# Patient Record
Sex: FEMALE | Race: WHITE | NOT HISPANIC OR LATINO | Employment: FULL TIME | URBAN - METROPOLITAN AREA
[De-identification: names, ages, dates, MRNs, and addresses within clinical notes are randomized per-mention and may not be internally consistent; named-entity substitution may affect disease eponyms.]

---

## 2017-04-08 ENCOUNTER — OFFICE VISIT (OUTPATIENT)
Dept: URGENT CARE | Facility: CLINIC | Age: 47
End: 2017-04-08
Payer: COMMERCIAL

## 2017-04-08 VITALS
HEIGHT: 65 IN | WEIGHT: 158 LBS | SYSTOLIC BLOOD PRESSURE: 126 MMHG | RESPIRATION RATE: 16 BRPM | DIASTOLIC BLOOD PRESSURE: 76 MMHG | BODY MASS INDEX: 26.33 KG/M2 | HEART RATE: 91 BPM | OXYGEN SATURATION: 97 % | TEMPERATURE: 99.7 F

## 2017-04-08 DIAGNOSIS — N90.89 LABIAL LESION: ICD-10-CM

## 2017-04-08 DIAGNOSIS — R52 BODY ACHES: ICD-10-CM

## 2017-04-08 LAB
FLUAV+FLUBV AG SPEC QL IA: NORMAL
HETEROPH AB SER QL LA: NORMAL
INT CON NEG: NEGATIVE
INT CON NEG: NEGATIVE
INT CON POS: POSITIVE
INT CON POS: POSITIVE

## 2017-04-08 PROCEDURE — 87804 INFLUENZA ASSAY W/OPTIC: CPT | Performed by: PHYSICIAN ASSISTANT

## 2017-04-08 PROCEDURE — 99000 SPECIMEN HANDLING OFFICE-LAB: CPT | Performed by: PHYSICIAN ASSISTANT

## 2017-04-08 PROCEDURE — 99203 OFFICE O/P NEW LOW 30 MIN: CPT | Performed by: PHYSICIAN ASSISTANT

## 2017-04-08 PROCEDURE — 86308 HETEROPHILE ANTIBODY SCREEN: CPT | Performed by: PHYSICIAN ASSISTANT

## 2017-04-08 ASSESSMENT — PAIN SCALES - GENERAL: PAINLEVEL: 4=SLIGHT-MODERATE PAIN

## 2017-04-11 ASSESSMENT — ENCOUNTER SYMPTOMS
FEVER: 1
SPUTUM PRODUCTION: 0
NAUSEA: 0
HEADACHES: 0
ABDOMINAL PAIN: 0
SHORTNESS OF BREATH: 0
CHILLS: 0
VOMITING: 0
MYALGIAS: 1
DIARRHEA: 0
DIZZINESS: 0

## 2017-04-11 NOTE — PROGRESS NOTES
"Subjective:      Bre Corado is a 46 y.o. female who presents with Other    Patient accompanied by her .         Other  Associated symptoms include a fever and myalgias. Pertinent negatives include no abdominal pain, chest pain, chills, headaches, nausea or vomiting.     Patient presents to urgent care reporting a rash on her vagina. She was seen yesterday by her gynecologist (Dr. Land) and had multiple cultures taken, including a viral culture to r/o herpes. She has no history of STDs or vaginal rashes similar to this. Surgical history includes a total hystorectomy. Denies any unusual vaginal discharge. She presents to urgent care today because she has been feeling feverish with some body aches since yesterday. She has not measured her temperature at home. She is also concerned that the rash could be due to a strep infection, because that has happened to her son before around his rectum.      Review of Systems   Constitutional: Positive for fever. Negative for chills.   Respiratory: Negative for sputum production and shortness of breath.    Cardiovascular: Negative for chest pain.   Gastrointestinal: Negative for nausea, vomiting, abdominal pain and diarrhea.   Genitourinary: Negative.    Musculoskeletal: Positive for myalgias.   Neurological: Negative for dizziness and headaches.          Objective:     /76 mmHg  Pulse 91  Temp(Src) 37.6 °C (99.7 °F)  Resp 16  Ht 1.651 m (5' 5\")  Wt 71.668 kg (158 lb)  BMI 26.29 kg/m2  SpO2 97%  Breastfeeding? No     Physical Exam   Constitutional: She is oriented to person, place, and time. She appears well-developed and well-nourished. No distress.   HENT:   Head: Normocephalic and atraumatic.   Eyes: Pupils are equal, round, and reactive to light.   Neck: Normal range of motion.   Cardiovascular: Normal rate.    Pulmonary/Chest: Effort normal.   Genitourinary:       Pelvic exam was performed with patient supine. There is lesion on the right " labia. There is lesion on the left labia.   Multiple lesions present on labia majora and minora that extends partly into vaginal canal. Lesions are circular and resemble small ulcerations. No drainage from the site. Non-tender.    Musculoskeletal: Normal range of motion.   Neurological: She is alert and oriented to person, place, and time.   Skin: Skin is warm and dry. She is not diaphoretic.   Psychiatric: She has a normal mood and affect. Her behavior is normal.   Nursing note and vitals reviewed.         PMH:  has a past medical history of Allergy; Ovarian failure ( 2010); and biopsy (1989 and 2005).  MEDS:   Current outpatient prescriptions:   •  amoxicillin-clavulanate (AUGMENTIN) 875-125 MG TABS, Take 1 Tab by mouth 2 times a day., Disp: 20 Tab, Rfl: 0  •  fexofenadine-pseudoephedrine (ALLEGRA-D 24 HOUR) 180-240 MG per tablet, Take 1 Tab by mouth every day., Disp: 30 Each, Rfl: 3  ALLERGIES:   Allergies   Allergen Reactions   • Antivert [Meclizine Hcl]      SURGHX:   Past Surgical History   Procedure Laterality Date   • Abdominal hysterectomy total  2007   • Breast biopsy Left      SOCHX:  reports that she has never smoked. She has never used smokeless tobacco. She reports that she drinks alcohol. She reports that she does not use illicit drugs.  FH: family history includes Cancer in her maternal grandmother; Hyperlipidemia in her mother; Stroke in her maternal grandfather.       Assessment/Plan:     1. Labial lesion  - ANAEROBIC/AEROBIC/GRAM STAIN    Patient has already undergone evaluation and had cultures taken by her gynecologist yesterday. Will take a bacterial culture of the lesions at today's visit. I spoke to Dr. Land today about the case. He is unsure what is causing the rash, but believes it may be due to lichen planus. She will follow up with him next week for ongoing management of the rash.     2. Body aches  - POCT Influenza A/B: NEGATIVE  - POCT Mononucleosis (mono): NEGATIVE

## 2017-10-26 ENCOUNTER — APPOINTMENT (OUTPATIENT)
Dept: MEDICAL GROUP | Facility: MEDICAL CENTER | Age: 47
End: 2017-10-26
Payer: COMMERCIAL

## 2018-12-04 ENCOUNTER — HOSPITAL ENCOUNTER (OUTPATIENT)
Dept: RADIOLOGY | Facility: MEDICAL CENTER | Age: 48
End: 2018-12-04
Attending: OBSTETRICS & GYNECOLOGY
Payer: COMMERCIAL

## 2018-12-04 DIAGNOSIS — Z12.31 SCREENING MAMMOGRAM, ENCOUNTER FOR: ICD-10-CM

## 2018-12-04 PROCEDURE — 77067 SCR MAMMO BI INCL CAD: CPT

## 2020-04-24 ENCOUNTER — OFFICE VISIT (OUTPATIENT)
Dept: URGENT CARE | Facility: PHYSICIAN GROUP | Age: 50
End: 2020-04-24
Payer: COMMERCIAL

## 2020-04-24 VITALS
WEIGHT: 170 LBS | DIASTOLIC BLOOD PRESSURE: 88 MMHG | HEART RATE: 103 BPM | BODY MASS INDEX: 28.32 KG/M2 | TEMPERATURE: 97.4 F | OXYGEN SATURATION: 94 % | SYSTOLIC BLOOD PRESSURE: 118 MMHG | HEIGHT: 65 IN | RESPIRATION RATE: 16 BRPM

## 2020-04-24 DIAGNOSIS — Z23 NEED FOR TETANUS, DIPHTHERIA, AND ACELLULAR PERTUSSIS (TDAP) VACCINE: ICD-10-CM

## 2020-04-24 DIAGNOSIS — S61.411A LACERATION OF RIGHT HAND WITHOUT FOREIGN BODY, INITIAL ENCOUNTER: ICD-10-CM

## 2020-04-24 PROCEDURE — 99213 OFFICE O/P EST LOW 20 MIN: CPT | Mod: 25 | Performed by: FAMILY MEDICINE

## 2020-04-24 PROCEDURE — 90471 IMMUNIZATION ADMIN: CPT | Performed by: FAMILY MEDICINE

## 2020-04-24 PROCEDURE — 90715 TDAP VACCINE 7 YRS/> IM: CPT | Performed by: FAMILY MEDICINE

## 2020-04-24 PROCEDURE — 12001 RPR S/N/AX/GEN/TRNK 2.5CM/<: CPT | Performed by: FAMILY MEDICINE

## 2020-04-24 SDOH — HEALTH STABILITY: MENTAL HEALTH: HOW OFTEN DO YOU HAVE A DRINK CONTAINING ALCOHOL?: MONTHLY OR LESS

## 2020-04-24 ASSESSMENT — PAIN SCALES - GENERAL: PAINLEVEL: 2=MINIMAL-SLIGHT

## 2020-04-24 NOTE — PROGRESS NOTES
Chief Complaint:    Chief Complaint   Patient presents with   • Hand Injury     R hand LAC happend today       History of Present Illness:    This is a new problem. Sustained laceration to right hand today at radial aspect of 2nd MCP joint area due to utility tool. Last tetanus immunization 11/23/10 per [facility].      Review of Systems:    Constitutional: Negative for fever, chills, and diaphoresis.   Eyes: Negative for change in vision, photophobia, pain, redness, and discharge.  ENT: Negative for ear pain, ear discharge, hearing loss, tinnitus, nasal congestion, nosebleeds, and sore throat.    Respiratory: Negative for shortness of breath.    Cardiovascular: Negative for chest pain.   Gastrointestinal: Negative for abdominal pain.   Genitourinary: Negative for dysuria.   Musculoskeletal: See HPI.   Skin: See HPI.   Neurological: Negative for dizziness, tingling, and focal weakness.   Heme: Does not bruise/bleed easily.   Psychiatric/Behavioral: Negative for depression, suicidal ideas, hallucinations, memory loss and substance abuse.       Past Medical History:    Past Medical History:   Diagnosis Date   • Allergy    • Hx of biopsy 1989 and 2005    left breast   • Ovarian failure  2010    other ovary taken out with hysterectomy     Past Surgical History:    Past Surgical History:   Procedure Laterality Date   • ABDOMINAL HYSTERECTOMY TOTAL  2007   • BREAST BIOPSY Left      Social History:    Social History     Socioeconomic History   • Marital status:      Spouse name: Not on file   • Number of children: Not on file   • Years of education: Not on file   • Highest education level: Not on file   Occupational History   • Not on file   Social Needs   • Financial resource strain: Not on file   • Food insecurity     Worry: Not on file     Inability: Not on file   • Transportation needs     Medical: Not on file     Non-medical: Not on file   Tobacco Use   • Smoking status: Never Smoker   • Smokeless tobacco:  "Never Used   Substance and Sexual Activity   • Alcohol use: Yes     Frequency: Monthly or less     Comment: rare   • Drug use: No   • Sexual activity: Yes     Partners: Male     Comment: , 2 children, real estate (relator)   Lifestyle   • Physical activity     Days per week: Not on file     Minutes per session: Not on file   • Stress: Not on file   Relationships   • Social connections     Talks on phone: Not on file     Gets together: Not on file     Attends Synagogue service: Not on file     Active member of club or organization: Not on file     Attends meetings of clubs or organizations: Not on file     Relationship status: Not on file   • Intimate partner violence     Fear of current or ex partner: Not on file     Emotionally abused: Not on file     Physically abused: Not on file     Forced sexual activity: Not on file   Other Topics Concern   • Not on file   Social History Narrative   • Not on file     Family History:    Family History   Problem Relation Age of Onset   • Hyperlipidemia Mother    • Cancer Maternal Grandmother         breast   • Stroke Maternal Grandfather      Medications:    Current Outpatient Medications on File Prior to Visit   Medication Sig Dispense Refill   • fexofenadine-pseudoephedrine (ALLEGRA-D 24 HOUR) 180-240 MG per tablet Take 1 Tab by mouth every day. 30 Each 3     No current facility-administered medications on file prior to visit.      Allergies:    Allergies   Allergen Reactions   • Antivert [Meclizine Hcl]        Vitals:    Vitals:    04/24/20 1648   BP: 118/88   Pulse: (!) 103   Resp: 16   Temp: 36.3 °C (97.4 °F)   TempSrc: Temporal   SpO2: 94%   Weight: 77.1 kg (170 lb)   Height: 1.651 m (5' 5\")       Physical Exam:    Constitutional: Vital signs reviewed. Appears well-developed and well-nourished. No acute distress.   Eyes: Sclera white, conjunctivae clear.  ENT: External ears normal. Hearing normal.  Cardiovascular: Peripheral pulses 2+. Normal cap refill, < 2 " seconds.  Pulmonary/Chest: Respirations non-labored.  Musculoskeletal: Normal gait. Normal range of motion. No muscular atrophy or weakness.  Neurological: Alert and oriented to person, place, and time. Muscle tone normal. Coordination normal. Light touch and sensation normal.  Skin: Right hand: radial aspect of 2nd MCP joint has 1 cm wound gaping open and actively bleeding.  Psychiatric: Normal mood and affect. Behavior is normal. Judgment and thought content normal.       Assessment / Plan:    1. Laceration of right hand without foreign body, initial encounter  - Tdap Vaccine =>6YO IM    2. Need for tetanus, diphtheria, and acellular pertussis (Tdap) vaccine  - Tdap Vaccine =>5YO IM      Wound cleansed with saline and Betadine.    Agreeable to sutures.    After informed verbal consent, local anesthesia with 2% Lidocaine.    Wound closed with 4-0 Ethilon, interrupted suture x 3.    Patient will apply antibiotic ointment and dressing to wound daily and change dressing daily.    Local wound care and signs/symptoms of infection discussed.    Tetanus immunization status updated - Tdap given.    She likely feels comfortable removing sutures on her own (she has removed sutures on her horse) in 7-10 days or she will return for removal if she cannot remove them herself or any other concerns.

## 2020-10-23 ENCOUNTER — HOSPITAL ENCOUNTER (OUTPATIENT)
Dept: HOSPITAL 8 - CVU | Age: 50
Discharge: HOME | End: 2020-10-23
Attending: INTERNAL MEDICINE
Payer: COMMERCIAL

## 2020-10-23 DIAGNOSIS — R06.02: ICD-10-CM

## 2020-10-23 DIAGNOSIS — R07.9: Primary | ICD-10-CM

## 2020-10-23 PROCEDURE — 93306 TTE W/DOPPLER COMPLETE: CPT

## 2020-10-23 PROCEDURE — 93356 MYOCRD STRAIN IMG SPCKL TRCK: CPT

## 2021-07-13 ENCOUNTER — OFFICE VISIT (OUTPATIENT)
Dept: INTERNAL MEDICINE | Facility: IMAGING CENTER | Age: 51
End: 2021-07-13
Payer: COMMERCIAL

## 2021-07-13 VITALS
HEIGHT: 65 IN | TEMPERATURE: 98.8 F | SYSTOLIC BLOOD PRESSURE: 130 MMHG | WEIGHT: 151 LBS | BODY MASS INDEX: 25.16 KG/M2 | RESPIRATION RATE: 16 BRPM | OXYGEN SATURATION: 96 % | HEART RATE: 79 BPM | DIASTOLIC BLOOD PRESSURE: 82 MMHG

## 2021-07-13 DIAGNOSIS — Z12.31 ENCOUNTER FOR SCREENING MAMMOGRAM FOR BREAST CANCER: ICD-10-CM

## 2021-07-13 DIAGNOSIS — E78.2 MIXED DYSLIPIDEMIA: ICD-10-CM

## 2021-07-13 DIAGNOSIS — Z79.890 PREMATURE MENOPAUSE ON HRT: ICD-10-CM

## 2021-07-13 DIAGNOSIS — Z00.00 WELLNESS EXAMINATION: ICD-10-CM

## 2021-07-13 DIAGNOSIS — Z71.85 VACCINE COUNSELING: ICD-10-CM

## 2021-07-13 DIAGNOSIS — E28.319 PREMATURE MENOPAUSE ON HRT: ICD-10-CM

## 2021-07-13 DIAGNOSIS — J30.2 SEASONAL ALLERGIES: ICD-10-CM

## 2021-07-13 DIAGNOSIS — Z12.11 SCREENING FOR COLON CANCER: ICD-10-CM

## 2021-07-13 PROBLEM — Z87.19 HISTORY OF DIVERTICULITIS: Status: ACTIVE | Noted: 2020-07-28

## 2021-07-13 PROBLEM — Z87.19 HISTORY OF DIVERTICULITIS: Status: ACTIVE | Noted: 2020-07-25

## 2021-07-13 PROCEDURE — 99396 PREV VISIT EST AGE 40-64: CPT | Performed by: FAMILY MEDICINE

## 2021-07-13 RX ORDER — CREAM BASE NO. 9
CREAM (GRAM) MISCELLANEOUS
COMMUNITY

## 2021-07-13 ASSESSMENT — PATIENT HEALTH QUESTIONNAIRE - PHQ9: CLINICAL INTERPRETATION OF PHQ2 SCORE: 0

## 2021-07-13 ASSESSMENT — FIBROSIS 4 INDEX: FIB4 SCORE: 0.96

## 2021-07-13 NOTE — PROGRESS NOTES
"Chief Complaint   Patient presents with   • Establish Care     Wellness visit       HPI:  Patient is a 50 y.o. female who presents today to establish care at my office. She has history of chronic mixed dyslipidemia without statin use (saw [facility] in 2020 for evaluation), and chronic anxiety that flairs up with health related issues. She has history of premature menopause due to hysterectomy and ovarian failure when she was in her 30s and uses bio identical HRT cream prescribed by Dr. Mahan. She uses it in PRN fashion instead of scheduled manner but endorses that cream helps with anxiety management. She is due for annual screening mammogram and screening colonoscopy (history of acute diverticulitis July 2020) and declines COVID/Shingrix vaccines at this time. 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 She rides horses/exercises using [employer] and [employer] and prefers the natural approach to medical management. She is in good spirits today and denies other new health concerns at this time.     Patient Active Problem List    Diagnosis Date Noted   • Seasonal allergies 07/13/2021   • Mixed dyslipidemia 07/13/2021   • Premature menopause on HRT 07/13/2021   • History of diverticulitis 07/25/2020       Past medical, surgical, family, and social history was reviewed and updated in Epic chart by me today.     Medications and allergies reviewed and updated in Epic chart by me today.     ROS:  Pertinent positives listed above in HPI. All other systems have been reviewed and are negative.    PE:   /82 (BP Location: Left arm, Patient Position: Sitting, BP Cuff Size: Adult)   Pulse 79   Temp 37.1 °C (98.8 °F) (Temporal)   Resp 16   Ht 1.651 m (5' 5\")   Wt 68.5 kg (151 lb)   SpO2 96%   BMI 25.13 kg/m²   Vital signs reviewed with patient.     Gen: Well developed; well nourished; no acute distress; age appropriate " appearance   HEENT: Normocephalic; atraumatic; PEERLA b/l; sclera clear b/l; b/l external auditory canals WNL; b/l TM WNL; nares patent; oropharynx clear; mask in place  Neck: No adenopathy; no thyromegaly  CV: Regular rate and rhythm; S1/ S2 present; no murmur, gallop or rub noted  Pulm: No respiratory distress; clear to ascultation b/l; no wheezing or stridor noted b/l  Abd: Adequate bowel sounds noted; soft and nontender; no rebound, rigidity, nor distention  Extremities: No peripheral edema b/l LE extremities/ no clubbing nor cyanosis noted  Skin: Warm and dry; no rashes noted   Neuro: No focal deficits noted   Psych: AAOx4; mood and affect are appropriate    A/P:  1. Mixed dyslipidemia  Uncontrolled per patient report - she has had prior labs and evaluation done in 2020 at [facility]. I will request records for review and recommend updating fasting lipid panel in near future for ongoing management. Patient is not interested in statin use historically.   - Lipid Profile; Future    2. Encounter for screening mammogram for breast cancer  Patient is due for annual screening mammogram, and I provided her with number to call imaging department for appointment scheduling in near future.   - MA-SCREENING MAMMO BILAT W/TOMOSYNTHESIS W/CAD; Future    3. Screening for colon cancer  Patient is overdue for screening colonoscopy, and referral made to [facility].   - REFERRAL TO GASTROENTEROLOGY    4. Seasonal allergies  Stable    5. Vaccine counseling  Patient declines COVID vaccination and is aware of Shingrix eligibility (declines vaccine today).     6. Premature menopause on HRT  Stable/ I encouraged patient to use bio identical hormone cream per Dr. Land's instructions instead of PRN for better efficacy. Due to his FPC, recommend patient establish with either another GYN team or seek hormone management by Dr. Jung/ Dr. Mcfarlane/ Dr. Palma.     7. Wellness examination  Patient is stable and is due  for annual fasting labs. Lab slips provided to patient at visit, and I encouraged to update other HCM items in near future for ongoing medical management.   - CBC WITH DIFFERENTIAL; Future  - Comp Metabolic Panel; Future  - TSH WITH REFLEX TO FT4; Future  - VITAMIN D,25 HYDROXY; Future  - HEMOGLOBIN A1C; Future

## 2021-11-04 ENCOUNTER — HOSPITAL ENCOUNTER (OUTPATIENT)
Dept: RADIOLOGY | Facility: MEDICAL CENTER | Age: 51
End: 2021-11-04
Attending: FAMILY MEDICINE
Payer: COMMERCIAL

## 2021-11-04 DIAGNOSIS — Z12.31 ENCOUNTER FOR SCREENING MAMMOGRAM FOR BREAST CANCER: ICD-10-CM

## 2021-11-04 PROCEDURE — 77063 BREAST TOMOSYNTHESIS BI: CPT

## 2022-02-15 ENCOUNTER — OFFICE VISIT (OUTPATIENT)
Dept: URGENT CARE | Facility: CLINIC | Age: 52
End: 2022-02-15
Payer: COMMERCIAL

## 2022-02-15 VITALS
BODY MASS INDEX: 26.66 KG/M2 | HEART RATE: 86 BPM | HEIGHT: 65 IN | RESPIRATION RATE: 16 BRPM | DIASTOLIC BLOOD PRESSURE: 68 MMHG | TEMPERATURE: 97.4 F | WEIGHT: 160 LBS | OXYGEN SATURATION: 96 % | SYSTOLIC BLOOD PRESSURE: 114 MMHG

## 2022-02-15 DIAGNOSIS — S61.210A LACERATION OF RIGHT INDEX FINGER WITHOUT FOREIGN BODY WITHOUT DAMAGE TO NAIL, INITIAL ENCOUNTER: ICD-10-CM

## 2022-02-15 PROCEDURE — 12001 RPR S/N/AX/GEN/TRNK 2.5CM/<: CPT | Performed by: PHYSICIAN ASSISTANT

## 2022-02-15 ASSESSMENT — ENCOUNTER SYMPTOMS
COUGH: 0
EYE REDNESS: 0
SORE THROAT: 0
EYE DISCHARGE: 0
SHORTNESS OF BREATH: 0
HEADACHES: 0
VOMITING: 0
NAUSEA: 0
FEVER: 0

## 2022-02-15 ASSESSMENT — FIBROSIS 4 INDEX: FIB4 SCORE: 0.98

## 2022-02-15 NOTE — PROGRESS NOTES
Subjective     Bre Corado is a 51 y.o. female who presents with Laceration (right index finger x today )        This is a new problem.  The patient presents to clinic secondary to a laceration to the distal aspect of the right index finger.  The patient states the laceration occurred approximately 1 hour prior to arrival.  The patient states she was using a new utility knife when she accidentally cut her right index finger.  The patient reports no associated pain/tenderness, swelling, bruising, or redness to the injured finger.  She also reports no decreased range of motion.  No numbness, tingling, or weakness.  The patient has not taken any OTC medications for her current symptoms.  The patient is up-to-date on her tetanus vaccine.     Laceration   Incident onset: x 1 hour ago. Pain location: right index finger. The laceration mechanism was a clean knife. The pain is mild. She reports no foreign bodies present. Her tetanus status is UTD.     PMH:  has a past medical history of Allergy, biopsy (1989 and 2005), and Ovarian failure ( 2010).  MEDS:   Current Outpatient Medications:   •  Hormone Cream Base (HRT BASE) Cream, Bio identical HRT cream from GYN, Disp: , Rfl:   •  VITAMIN D, CHOLECALCIFEROL, PO, Take  by mouth., Disp: , Rfl:   •  Multiple Vitamins-Minerals (ZINC PO), Take  by mouth., Disp: , Rfl:   •  Menaquinone-7 (VITAMIN K2 PO), Take  by mouth., Disp: , Rfl:   •  multivitamin (THERAGRAN) Tab, Take 1 tablet by mouth every day., Disp: , Rfl:   •  MAGNESIUM OXIDE PO, Take  by mouth., Disp: , Rfl:   •  Non Formulary Request, Strontium supplement daily, Disp: , Rfl:   ALLERGIES:   Allergies   Allergen Reactions   • Antivert [Meclizine Hcl] Hives     SURGHX:   Past Surgical History:   Procedure Laterality Date   • ABDOMINAL HYSTERECTOMY TOTAL  2007   • BREAST BIOPSY Left    • TONSILLECTOMY AND ADENOIDECTOMY       SOCHX:  reports that she has never smoked. She has never used smokeless tobacco. She  "reports previous alcohol use. She reports that she does not use drugs.  FH: Family history was reviewed, no pertinent findings to report      Review of Systems   Constitutional: Negative for fever.   HENT: Negative for congestion, ear pain and sore throat.    Eyes: Negative for discharge and redness.   Respiratory: Negative for cough and shortness of breath.    Cardiovascular: Negative for chest pain and leg swelling.   Gastrointestinal: Negative for nausea and vomiting.   Musculoskeletal: Negative for joint pain.   Skin: Negative for rash.        + laceration of right index finger   Neurological: Negative for headaches.              Objective     /68 (BP Location: Left arm, Patient Position: Sitting, BP Cuff Size: Adult)   Pulse 86   Temp 36.3 °C (97.4 °F) (Temporal)   Resp 16   Ht 1.651 m (5' 5\")   Wt 72.6 kg (160 lb)   SpO2 96%   BMI 26.63 kg/m²      Physical Exam  Constitutional:       General: She is not in acute distress.     Appearance: Normal appearance. She is well-developed. She is not ill-appearing.   HENT:      Head: Normocephalic and atraumatic.      Right Ear: External ear normal.      Left Ear: External ear normal.   Eyes:      Extraocular Movements: Extraocular movements intact.      Conjunctiva/sclera: Conjunctivae normal.   Cardiovascular:      Rate and Rhythm: Normal rate.   Pulmonary:      Effort: Pulmonary effort is normal.   Musculoskeletal:      Cervical back: Normal range of motion and neck supple.      Comments:   Right Index Finger:  2 cm partial-thickness laceration to the distal aspect of the right index finger near the nail fold with mild active bleeding.  No visible foreign bodies.  No tenderness palpation.  No swelling.  No ecchymosis.  No surrounding erythema.  No increased warmth.  No discharge/drainage.  ROM intact -the patient demonstrates full active range of motion of the right index finger  Neurovascular intact distally  Strength 5/5 -flexion/extension of the right " index finger against resistance   Skin:     General: Skin is warm and dry.   Neurological:      Mental Status: She is alert and oriented to person, place, and time.               Progress:  Wound Care:   Cleansed the patient's laceration with diluted Hibiclens.  Irrigated the laceration with saline bullets.  The patient declined primary closure with sutures at this time. The wound edges of the patient's laceration are very well approximated. Applied Dermabond to the patient's laceration.  Educated the patient on proper wound care.  Advised patient to monitor for signs of infection.           Assessment & Plan          1. Laceration of right index finger without foreign body without damage to nail, initial encounter    Differential diagnoses, supportive care, and indications for immediate follow-up discussed with patient.   Instructed to return to clinic or nearest emergency department for any change in condition, further concerns, or worsening of symptoms.    OTC Tylenol or Motrin for fever/discomfort.  Keep laceration clean and dry  Cover laceration while at work  Allow wound to be open to the air for at least a portion of the day  Avoid soaking the wound  Monitor for signs of infection  Follow-up with PCP  Return to clinic or go to the ED if symptoms worsen or fail to improve, or if patient should develop worsening/increasing/persistent pain/tenderness to the injured area, swelling, bruising, redness or warmth to the injured area, discharge/drainage from the wound, decreased range of motion, fever/chills, secondary signs of infection, and/or any concerning symptoms.    Discussed plan with the patient, and she agrees to the above.     I personally reviewed prior external notes and test results pertinent to today's visit.  I have independently reviewed and interpreted all diagnostics ordered during this urgent care visit.     Time spent evaluating this patient was at least 30 minutes and includes preparing for  visit, obtaining history, exam and evaluation, ordering labs/tests/procedures/medications, independent interpretation, and counseling/education.    Please note that this dictation was created using voice recognition software. I have made every reasonable attempt to correct obvious errors, but I expect that there may be errors of grammar and possibly content that I did not discover before finalizing the note.     This note was electronically signed by Kizzy Paredes PA-C

## 2023-05-18 ENCOUNTER — HOSPITAL ENCOUNTER (OUTPATIENT)
Facility: MEDICAL CENTER | Age: 53
End: 2023-05-18
Attending: PSYCHIATRY & NEUROLOGY
Payer: COMMERCIAL

## 2024-03-14 ENCOUNTER — OFFICE VISIT (OUTPATIENT)
Dept: INTERNAL MEDICINE | Facility: IMAGING CENTER | Age: 54
End: 2024-03-14
Payer: COMMERCIAL

## 2024-03-14 VITALS
TEMPERATURE: 98.5 F | BODY MASS INDEX: 28.49 KG/M2 | OXYGEN SATURATION: 93 % | RESPIRATION RATE: 16 BRPM | WEIGHT: 171 LBS | HEART RATE: 93 BPM | HEIGHT: 65 IN | SYSTOLIC BLOOD PRESSURE: 130 MMHG | DIASTOLIC BLOOD PRESSURE: 70 MMHG

## 2024-03-14 DIAGNOSIS — E78.2 MIXED DYSLIPIDEMIA: ICD-10-CM

## 2024-03-14 DIAGNOSIS — Z78.0 MENOPAUSE: ICD-10-CM

## 2024-03-14 PROCEDURE — 3078F DIAST BP <80 MM HG: CPT | Performed by: INTERNAL MEDICINE

## 2024-03-14 PROCEDURE — 3075F SYST BP GE 130 - 139MM HG: CPT | Performed by: INTERNAL MEDICINE

## 2024-03-14 PROCEDURE — 99215 OFFICE O/P EST HI 40 MIN: CPT | Performed by: INTERNAL MEDICINE

## 2024-03-14 SDOH — HEALTH STABILITY: PHYSICAL HEALTH: ON AVERAGE, HOW MANY DAYS PER WEEK DO YOU ENGAGE IN MODERATE TO STRENUOUS EXERCISE (LIKE A BRISK WALK)?: 3 DAYS

## 2024-03-14 SDOH — ECONOMIC STABILITY: TRANSPORTATION INSECURITY
IN THE PAST 12 MONTHS, HAS THE LACK OF TRANSPORTATION KEPT YOU FROM MEDICAL APPOINTMENTS OR FROM GETTING MEDICATIONS?: NO

## 2024-03-14 SDOH — ECONOMIC STABILITY: HOUSING INSECURITY
IN THE LAST 12 MONTHS, WAS THERE A TIME WHEN YOU DID NOT HAVE A STEADY PLACE TO SLEEP OR SLEPT IN A SHELTER (INCLUDING NOW)?: NO

## 2024-03-14 SDOH — HEALTH STABILITY: MENTAL HEALTH
STRESS IS WHEN SOMEONE FEELS TENSE, NERVOUS, ANXIOUS, OR CAN'T SLEEP AT NIGHT BECAUSE THEIR MIND IS TROUBLED. HOW STRESSED ARE YOU?: ONLY A LITTLE

## 2024-03-14 SDOH — ECONOMIC STABILITY: FOOD INSECURITY: WITHIN THE PAST 12 MONTHS, YOU WORRIED THAT YOUR FOOD WOULD RUN OUT BEFORE YOU GOT MONEY TO BUY MORE.: NEVER TRUE

## 2024-03-14 SDOH — ECONOMIC STABILITY: INCOME INSECURITY: IN THE LAST 12 MONTHS, WAS THERE A TIME WHEN YOU WERE NOT ABLE TO PAY THE MORTGAGE OR RENT ON TIME?: NO

## 2024-03-14 SDOH — HEALTH STABILITY: PHYSICAL HEALTH: ON AVERAGE, HOW MANY MINUTES DO YOU ENGAGE IN EXERCISE AT THIS LEVEL?: 20 MIN

## 2024-03-14 SDOH — ECONOMIC STABILITY: FOOD INSECURITY: WITHIN THE PAST 12 MONTHS, THE FOOD YOU BOUGHT JUST DIDN'T LAST AND YOU DIDN'T HAVE MONEY TO GET MORE.: NEVER TRUE

## 2024-03-14 SDOH — ECONOMIC STABILITY: HOUSING INSECURITY: IN THE LAST 12 MONTHS, HOW MANY PLACES HAVE YOU LIVED?: 1

## 2024-03-14 SDOH — ECONOMIC STABILITY: INCOME INSECURITY: HOW HARD IS IT FOR YOU TO PAY FOR THE VERY BASICS LIKE FOOD, HOUSING, MEDICAL CARE, AND HEATING?: NOT HARD AT ALL

## 2024-03-14 SDOH — ECONOMIC STABILITY: TRANSPORTATION INSECURITY
IN THE PAST 12 MONTHS, HAS LACK OF RELIABLE TRANSPORTATION KEPT YOU FROM MEDICAL APPOINTMENTS, MEETINGS, WORK OR FROM GETTING THINGS NEEDED FOR DAILY LIVING?: NO

## 2024-03-14 SDOH — ECONOMIC STABILITY: TRANSPORTATION INSECURITY
IN THE PAST 12 MONTHS, HAS LACK OF TRANSPORTATION KEPT YOU FROM MEETINGS, WORK, OR FROM GETTING THINGS NEEDED FOR DAILY LIVING?: NO

## 2024-03-14 ASSESSMENT — SOCIAL DETERMINANTS OF HEALTH (SDOH)
HOW OFTEN DO YOU ATTEND CHURCH OR RELIGIOUS SERVICES?: 1 TO 4 TIMES PER YEAR
DO YOU BELONG TO ANY CLUBS OR ORGANIZATIONS SUCH AS CHURCH GROUPS UNIONS, FRATERNAL OR ATHLETIC GROUPS, OR SCHOOL GROUPS?: YES
IN A TYPICAL WEEK, HOW MANY TIMES DO YOU TALK ON THE PHONE WITH FAMILY, FRIENDS, OR NEIGHBORS?: MORE THAN THREE TIMES A WEEK
HOW OFTEN DO YOU HAVE A DRINK CONTAINING ALCOHOL: MONTHLY OR LESS
HOW OFTEN DO YOU GET TOGETHER WITH FRIENDS OR RELATIVES?: TWICE A WEEK
HOW MANY DRINKS CONTAINING ALCOHOL DO YOU HAVE ON A TYPICAL DAY WHEN YOU ARE DRINKING: 1 OR 2
HOW OFTEN DO YOU ATTENT MEETINGS OF THE CLUB OR ORGANIZATION YOU BELONG TO?: MORE THAN 4 TIMES PER YEAR
HOW HARD IS IT FOR YOU TO PAY FOR THE VERY BASICS LIKE FOOD, HOUSING, MEDICAL CARE, AND HEATING?: NOT HARD AT ALL
DO YOU BELONG TO ANY CLUBS OR ORGANIZATIONS SUCH AS CHURCH GROUPS UNIONS, FRATERNAL OR ATHLETIC GROUPS, OR SCHOOL GROUPS?: YES
HOW OFTEN DO YOU ATTENT MEETINGS OF THE CLUB OR ORGANIZATION YOU BELONG TO?: MORE THAN 4 TIMES PER YEAR
HOW OFTEN DO YOU GET TOGETHER WITH FRIENDS OR RELATIVES?: TWICE A WEEK
HOW OFTEN DO YOU HAVE SIX OR MORE DRINKS ON ONE OCCASION: NEVER
HOW OFTEN DO YOU ATTEND CHURCH OR RELIGIOUS SERVICES?: 1 TO 4 TIMES PER YEAR
WITHIN THE PAST 12 MONTHS, YOU WORRIED THAT YOUR FOOD WOULD RUN OUT BEFORE YOU GOT THE MONEY TO BUY MORE: NEVER TRUE
IN A TYPICAL WEEK, HOW MANY TIMES DO YOU TALK ON THE PHONE WITH FAMILY, FRIENDS, OR NEIGHBORS?: MORE THAN THREE TIMES A WEEK

## 2024-03-14 ASSESSMENT — LIFESTYLE VARIABLES
SKIP TO QUESTIONS 9-10: 1
HOW MANY STANDARD DRINKS CONTAINING ALCOHOL DO YOU HAVE ON A TYPICAL DAY: 1 OR 2
AUDIT-C TOTAL SCORE: 1
HOW OFTEN DO YOU HAVE A DRINK CONTAINING ALCOHOL: MONTHLY OR LESS
HOW OFTEN DO YOU HAVE SIX OR MORE DRINKS ON ONE OCCASION: NEVER

## 2024-03-14 NOTE — PROGRESS NOTES
"Chief Complaint   Patient presents with    [facility]              HISTORY OF THE PRESENT ILLNESS: Patient is a 53 y.o. female.     53-year-old female.  She comes in to [facility] care.  She has a history of hyperlipidemia.  She is not on any regular medications.  She was seen last year for ankle pain at orthopedics.    Mammogram-November 2021 category 1  Gyn: Post hysterectomy.  Single ovary remains.  Colon cancer screening: Never performed    Past medical history:  MRI in November 2011 for \"neurologic deficit\".  MRI lumbar spine 2011 for low back pain with radiculopathy.  It showed diffuse disc bulging in the lumbar spine with exaggerated lordosis but no spinal stenosis or facet arthropathy noted  CAT scan abdomen and pelvis July 2020.  This was done for diverticulitis:IMPRESSION:     1.  At the junction of the descending colon and sigmoid colon there are diverticula present and there is pericolonic inflammation suggesting mild diverticulitis. After adequate treatment follow-up colonoscopy should be performed to ensure no underlying   mass is present.  2.  Multiple bilateral renal cystic lesions.  3.  Severely contracted gallbladder.    Allergies: Antivert [meclizine hcl]    Current Outpatient Medications Ordered in Epic   Medication Sig Dispense Refill    Hormone Cream Base (HRT BASE) Cream Bio identical HRT cream from GYN      VITAMIN D, CHOLECALCIFEROL, PO Take  by mouth.      Multiple Vitamins-Minerals (ZINC PO) Take  by mouth.      Menaquinone-7 (VITAMIN K2 PO) Take  by mouth.      Non Formulary Request Strontium supplement daily      multivitamin (THERAGRAN) Tab Take 1 tablet by mouth every day.      MAGNESIUM OXIDE PO Take  by mouth.       No current Epic-ordered facility-administered medications on file.       Past medical history, social history and family history were reviewed from chart today    Review of systems: Per HPI.    All others negative.     Exam: /70 (BP Location: Left arm, Patient " "Position: Sitting, BP Cuff Size: Adult)   Pulse 93   Temp 36.9 °C (98.5 °F) (Temporal)   Resp 16   Ht 1.651 m (5' 5\")   Wt 77.6 kg (171 lb)   SpO2 93%   General: Well-appearing.  Friendly.  HEENT: Normocephalic.  Pulmonary: Clear.. Normal effort.  Cardiovascular: Regular   Abdomen: Normal appearing. Soft, nontender, nondistended.   Neurologic: Cranial nerves II through XII are grossly intact, alert and oriented x3      Diagnosis:  Moderate    New patient.  Appears to be in good health.  Her primary issue is dyslipidemia.  She would like to work on lifestyle and reassess.  We agreed to recheck in 3 months.  Discussed screening exams including mammography and colon cancer screening which she has not completed    My total time spent caring for the patient on the day of the encounter was  greater than *** minutes.   This includes obtaining history, reviewing chart, physical exam, patient education, reviewing outside records, placing orders, interpreting tests and coordinating care.    Portions of this note were completed using voice recognition software (Dragon Naturally speaking software) . Occasional transcription errors may have escaped proof reading. I have made every reasonable attempt to correct obvious errors, but I expect that there are errors of grammar and possibly content that I did not discover before finalizing the note.     "

## 2024-06-07 RX ORDER — CEPHALEXIN 500 MG/1
500 CAPSULE ORAL 4 TIMES DAILY
Qty: 28 CAPSULE | Refills: 0 | Status: SHIPPED | OUTPATIENT
Start: 2024-06-07

## 2024-06-17 ENCOUNTER — APPOINTMENT (RX ONLY)
Dept: URBAN - METROPOLITAN AREA CLINIC 15 | Facility: CLINIC | Age: 54
Setting detail: DERMATOLOGY
End: 2024-06-17

## 2024-06-17 DIAGNOSIS — Z71.89 OTHER SPECIFIED COUNSELING: ICD-10-CM

## 2024-06-17 DIAGNOSIS — L44.8 OTHER SPECIFIED PAPULOSQUAMOUS DISORDERS: ICD-10-CM

## 2024-06-17 DIAGNOSIS — L82.1 OTHER SEBORRHEIC KERATOSIS: ICD-10-CM

## 2024-06-17 DIAGNOSIS — D18.0 HEMANGIOMA: ICD-10-CM

## 2024-06-17 DIAGNOSIS — D22 MELANOCYTIC NEVI: ICD-10-CM

## 2024-06-17 DIAGNOSIS — L81.4 OTHER MELANIN HYPERPIGMENTATION: ICD-10-CM

## 2024-06-17 PROBLEM — D22.5 MELANOCYTIC NEVI OF TRUNK: Status: ACTIVE | Noted: 2024-06-17

## 2024-06-17 PROBLEM — D22.39 MELANOCYTIC NEVI OF OTHER PARTS OF FACE: Status: ACTIVE | Noted: 2024-06-17

## 2024-06-17 PROBLEM — D18.01 HEMANGIOMA OF SKIN AND SUBCUTANEOUS TISSUE: Status: ACTIVE | Noted: 2024-06-17

## 2024-06-17 PROBLEM — D22.71 MELANOCYTIC NEVI OF RIGHT LOWER LIMB, INCLUDING HIP: Status: ACTIVE | Noted: 2024-06-17

## 2024-06-17 PROCEDURE — ? COUNSELING

## 2024-06-17 PROCEDURE — ? ADDITIONAL NOTES

## 2024-06-17 PROCEDURE — 99203 OFFICE O/P NEW LOW 30 MIN: CPT

## 2024-06-17 PROCEDURE — ? PHOTO-DOCUMENTATION

## 2024-06-17 PROCEDURE — ? SUNSCREEN RECOMMENDATIONS

## 2024-06-17 ASSESSMENT — LOCATION DETAILED DESCRIPTION DERM
LOCATION DETAILED: RIGHT BUTTOCK
LOCATION DETAILED: RIGHT CENTRAL MALAR CHEEK
LOCATION DETAILED: RIGHT MEDIAL INFERIOR CHEST
LOCATION DETAILED: LEFT RIB CAGE
LOCATION DETAILED: LEFT RADIAL DORSAL HAND
LOCATION DETAILED: RIGHT SUPRAPUBIC SKIN
LOCATION DETAILED: LEFT ANTERIOR DISTAL UPPER ARM
LOCATION DETAILED: LEFT PROXIMAL DORSAL FOREARM
LOCATION DETAILED: RIGHT PROXIMAL CALF

## 2024-06-17 ASSESSMENT — LOCATION SIMPLE DESCRIPTION DERM
LOCATION SIMPLE: GROIN
LOCATION SIMPLE: LEFT HAND
LOCATION SIMPLE: RIGHT CALF
LOCATION SIMPLE: RIGHT CHEEK
LOCATION SIMPLE: ABDOMEN
LOCATION SIMPLE: LEFT FOREARM
LOCATION SIMPLE: RIGHT BUTTOCK
LOCATION SIMPLE: LEFT UPPER ARM
LOCATION SIMPLE: CHEST

## 2024-06-17 ASSESSMENT — LOCATION ZONE DERM
LOCATION ZONE: HAND
LOCATION ZONE: FACE
LOCATION ZONE: TRUNK
LOCATION ZONE: ARM
LOCATION ZONE: LEG

## 2024-06-17 NOTE — PROCEDURE: ADDITIONAL NOTES
Render Risk Assessment In Note?: no
Detail Level: Detailed
Additional Notes: Pt reports using OTC LN2 at home
Additional Notes: Pt had punch biopsy done in 20’s

## 2024-06-17 NOTE — HPI: FULL BODY SKIN EXAMINATION
What Type Of Note Output Would You Prefer (Optional)?: Standard Output
What Is The Reason For Today's Visit?: Full Body Skin Examination
What Is The Reason For Today's Visit? (Being Monitored For X): concerning skin lesions on an annual basis
Additional History: Pt reports getting punch biopsy on right cheek - pt reports it was benign

## 2024-07-12 DIAGNOSIS — Z78.0 MENOPAUSE: ICD-10-CM

## 2024-07-12 RX ORDER — ESTRADIOL 100 %
POWDER (GRAM) MISCELLANEOUS
Qty: 90 G | Refills: 0 | Status: SHIPPED | OUTPATIENT
Start: 2024-07-12

## 2024-08-27 ENCOUNTER — APPOINTMENT (OUTPATIENT)
Dept: INTERNAL MEDICINE | Facility: IMAGING CENTER | Age: 54
End: 2024-08-27
Payer: COMMERCIAL

## 2025-02-11 ENCOUNTER — TELEPHONE (OUTPATIENT)
Dept: INTERNAL MEDICINE | Facility: IMAGING CENTER | Age: 55
End: 2025-02-11
Payer: COMMERCIAL

## 2025-02-11 NOTE — TELEPHONE ENCOUNTER
----- Message from Nurse Mary CAMARILLO R.N. sent at 2/10/2025  1:34 PM PST -----  Please call and schedule annual labs and exam with Dr Crisostomo.  Thanks  M    Patient schedule for visits in May.

## 2025-05-22 ENCOUNTER — APPOINTMENT (OUTPATIENT)
Dept: INTERNAL MEDICINE | Facility: IMAGING CENTER | Age: 55
End: 2025-05-22
Payer: COMMERCIAL